# Patient Record
Sex: FEMALE | Race: WHITE | NOT HISPANIC OR LATINO | URBAN - METROPOLITAN AREA
[De-identification: names, ages, dates, MRNs, and addresses within clinical notes are randomized per-mention and may not be internally consistent; named-entity substitution may affect disease eponyms.]

---

## 2024-03-22 ENCOUNTER — EMERGENCY (EMERGENCY)
Facility: HOSPITAL | Age: 54
LOS: 0 days | Discharge: HOME | End: 2024-03-22
Attending: EMERGENCY MEDICINE
Payer: MEDICAID

## 2024-03-22 VITALS
HEART RATE: 100 BPM | TEMPERATURE: 98 F | DIASTOLIC BLOOD PRESSURE: 89 MMHG | WEIGHT: 179.9 LBS | RESPIRATION RATE: 18 BRPM | SYSTOLIC BLOOD PRESSURE: 129 MMHG | OXYGEN SATURATION: 99 %

## 2024-03-22 DIAGNOSIS — R11.0 NAUSEA: ICD-10-CM

## 2024-03-22 DIAGNOSIS — I10 ESSENTIAL (PRIMARY) HYPERTENSION: ICD-10-CM

## 2024-03-22 DIAGNOSIS — R42 DIZZINESS AND GIDDINESS: ICD-10-CM

## 2024-03-22 DIAGNOSIS — E78.5 HYPERLIPIDEMIA, UNSPECIFIED: ICD-10-CM

## 2024-03-22 DIAGNOSIS — R52 PAIN, UNSPECIFIED: ICD-10-CM

## 2024-03-22 DIAGNOSIS — R50.9 FEVER, UNSPECIFIED: ICD-10-CM

## 2024-03-22 DIAGNOSIS — R19.7 DIARRHEA, UNSPECIFIED: ICD-10-CM

## 2024-03-22 LAB
ALBUMIN SERPL ELPH-MCNC: 4.7 G/DL — SIGNIFICANT CHANGE UP (ref 3.5–5.2)
ALP SERPL-CCNC: 65 U/L — SIGNIFICANT CHANGE UP (ref 30–115)
ALT FLD-CCNC: 44 U/L — HIGH (ref 0–41)
ANION GAP SERPL CALC-SCNC: 14 MMOL/L — SIGNIFICANT CHANGE UP (ref 7–14)
AST SERPL-CCNC: 22 U/L — SIGNIFICANT CHANGE UP (ref 0–41)
BASOPHILS # BLD AUTO: 0.02 K/UL — SIGNIFICANT CHANGE UP (ref 0–0.2)
BASOPHILS NFR BLD AUTO: 0.3 % — SIGNIFICANT CHANGE UP (ref 0–1)
BILIRUB SERPL-MCNC: 0.9 MG/DL — SIGNIFICANT CHANGE UP (ref 0.2–1.2)
BUN SERPL-MCNC: 10 MG/DL — SIGNIFICANT CHANGE UP (ref 10–20)
CALCIUM SERPL-MCNC: 9.1 MG/DL — SIGNIFICANT CHANGE UP (ref 8.4–10.5)
CHLORIDE SERPL-SCNC: 101 MMOL/L — SIGNIFICANT CHANGE UP (ref 98–110)
CO2 SERPL-SCNC: 23 MMOL/L — SIGNIFICANT CHANGE UP (ref 17–32)
CREAT SERPL-MCNC: 0.6 MG/DL — LOW (ref 0.7–1.5)
EGFR: 107 ML/MIN/1.73M2 — SIGNIFICANT CHANGE UP
EOSINOPHIL # BLD AUTO: 0.02 K/UL — SIGNIFICANT CHANGE UP (ref 0–0.7)
EOSINOPHIL NFR BLD AUTO: 0.3 % — SIGNIFICANT CHANGE UP (ref 0–8)
GLUCOSE SERPL-MCNC: 116 MG/DL — HIGH (ref 70–99)
HCT VFR BLD CALC: 41 % — SIGNIFICANT CHANGE UP (ref 37–47)
HGB BLD-MCNC: 14.3 G/DL — SIGNIFICANT CHANGE UP (ref 12–16)
IMM GRANULOCYTES NFR BLD AUTO: 0.3 % — SIGNIFICANT CHANGE UP (ref 0.1–0.3)
LYMPHOCYTES # BLD AUTO: 0.52 K/UL — LOW (ref 1.2–3.4)
LYMPHOCYTES # BLD AUTO: 8.7 % — LOW (ref 20.5–51.1)
MCHC RBC-ENTMCNC: 31.3 PG — HIGH (ref 27–31)
MCHC RBC-ENTMCNC: 34.9 G/DL — SIGNIFICANT CHANGE UP (ref 32–37)
MCV RBC AUTO: 89.7 FL — SIGNIFICANT CHANGE UP (ref 81–99)
MONOCYTES # BLD AUTO: 0.35 K/UL — SIGNIFICANT CHANGE UP (ref 0.1–0.6)
MONOCYTES NFR BLD AUTO: 5.8 % — SIGNIFICANT CHANGE UP (ref 1.7–9.3)
NEUTROPHILS # BLD AUTO: 5.06 K/UL — SIGNIFICANT CHANGE UP (ref 1.4–6.5)
NEUTROPHILS NFR BLD AUTO: 84.6 % — HIGH (ref 42.2–75.2)
NRBC # BLD: 0 /100 WBCS — SIGNIFICANT CHANGE UP (ref 0–0)
PLATELET # BLD AUTO: 208 K/UL — SIGNIFICANT CHANGE UP (ref 130–400)
PMV BLD: 8.8 FL — SIGNIFICANT CHANGE UP (ref 7.4–10.4)
POTASSIUM SERPL-MCNC: 4 MMOL/L — SIGNIFICANT CHANGE UP (ref 3.5–5)
POTASSIUM SERPL-SCNC: 4 MMOL/L — SIGNIFICANT CHANGE UP (ref 3.5–5)
PROT SERPL-MCNC: 7.4 G/DL — SIGNIFICANT CHANGE UP (ref 6–8)
RBC # BLD: 4.57 M/UL — SIGNIFICANT CHANGE UP (ref 4.2–5.4)
RBC # FLD: 12.5 % — SIGNIFICANT CHANGE UP (ref 11.5–14.5)
SODIUM SERPL-SCNC: 138 MMOL/L — SIGNIFICANT CHANGE UP (ref 135–146)
WBC # BLD: 5.99 K/UL — SIGNIFICANT CHANGE UP (ref 4.8–10.8)
WBC # FLD AUTO: 5.99 K/UL — SIGNIFICANT CHANGE UP (ref 4.8–10.8)

## 2024-03-22 PROCEDURE — 96375 TX/PRO/DX INJ NEW DRUG ADDON: CPT

## 2024-03-22 PROCEDURE — 99284 EMERGENCY DEPT VISIT MOD MDM: CPT

## 2024-03-22 PROCEDURE — 85025 COMPLETE CBC W/AUTO DIFF WBC: CPT

## 2024-03-22 PROCEDURE — 80053 COMPREHEN METABOLIC PANEL: CPT

## 2024-03-22 PROCEDURE — 96374 THER/PROPH/DIAG INJ IV PUSH: CPT

## 2024-03-22 PROCEDURE — 99284 EMERGENCY DEPT VISIT MOD MDM: CPT | Mod: 25

## 2024-03-22 PROCEDURE — 36415 COLL VENOUS BLD VENIPUNCTURE: CPT

## 2024-03-22 RX ORDER — FAMOTIDINE 10 MG/ML
20 INJECTION INTRAVENOUS ONCE
Refills: 0 | Status: COMPLETED | OUTPATIENT
Start: 2024-03-22 | End: 2024-03-22

## 2024-03-22 RX ORDER — ONDANSETRON 8 MG/1
4 TABLET, FILM COATED ORAL ONCE
Refills: 0 | Status: COMPLETED | OUTPATIENT
Start: 2024-03-22 | End: 2024-03-22

## 2024-03-22 RX ORDER — METOCLOPRAMIDE HCL 10 MG
10 TABLET ORAL ONCE
Refills: 0 | Status: COMPLETED | OUTPATIENT
Start: 2024-03-22 | End: 2024-03-22

## 2024-03-22 RX ADMIN — FAMOTIDINE 20 MILLIGRAM(S): 10 INJECTION INTRAVENOUS at 09:07

## 2024-03-22 RX ADMIN — Medication 104 MILLIGRAM(S): at 10:22

## 2024-03-22 RX ADMIN — ONDANSETRON 4 MILLIGRAM(S): 8 TABLET, FILM COATED ORAL at 09:09

## 2024-03-22 NOTE — ED ADULT NURSE NOTE - OBJECTIVE STATEMENT
Pt presents to the ED c/o N/D and abdominal pain that started last night. Denies any vomiting. Denies any urinary symptoms.

## 2024-03-22 NOTE — ED PROVIDER NOTE - NSFOLLOWUPINSTRUCTIONS_ED_ALL_ED_FT
Follow up with PMD in 1-3 days    Acute Nausea and Vomiting    WHAT YOU NEED TO KNOW:  Acute nausea and vomiting start suddenly, worsen quickly, and last a short time.    DISCHARGE INSTRUCTIONS:    Return to the emergency department if:   You see blood in your vomit or your bowel movements.  You have sudden, severe pain in your chest and upper abdomen after hard vomiting or retching.  You have swelling in your neck and chest.   You are dizzy, cold, and thirsty and your eyes and mouth are dry.  You are urinating very little or not at all.  You have muscle weakness, leg cramps, and trouble breathing.   Your heart is beating much faster than normal.       You continue to vomit for more than 48 hours.     Contact your healthcare provider if:   You have frequent dry heaves (vomiting but nothing comes out).  Your nausea and vomiting does not get better or go away after you use medicine.  You have questions or concerns about your condition or treatment.    Medicines: You may need any of the following:   Medicines may be given to calm your stomach and stop your vomiting. You may also need medicines to help you feel more relaxed or to stop nausea and vomiting caused by motion sickness.  Gastrointestinal stimulants are used to help empty your stomach and bowels. This may help decrease nausea and vomiting.  Take your medicine as directed. Contact your healthcare provider if you think your medicine is not helping or if you have side effects. Tell him or her if you are allergic to any medicine. Keep a list of the medicines, vitamins, and herbs you take. Include the amounts, and when and why you take them. Bring the list or the pill bottles to follow-up visits. Carry your medicine list with you in case of an emergency.    Prevent or manage acute nausea and vomiting:   Do not drink alcohol. Alcohol may upset or irritate your stomach. Too much alcohol can also cause acute nausea and vomiting.  Control stress. Headaches due to stress may cause nausea and vomiting. Find ways to relax and manage your stress. Get more rest and sleep  Drink more liquids as directed. Vomiting can lead to dehydration. It is important to drink more liquids to help replace lost body fluids. Ask your healthcare provider how much liquid to drink each day and which liquids are best for you. Your provider may recommend that you drink an oral rehydration solution (ORS). ORS contains water, salts, and sugar that are needed to replace the lost body fluids. Ask what kind of ORS to use, how much to drink, and where to get it.  Eat smaller meals, more often. Eat small amounts of food every 2 to 3 hours, even if you are not hungry. Food in your stomach may decrease your nausea.  Talk to your healthcare provider before you take over-the-counter (OTC) medicines. These medicines can cause serious problems if you use certain other medicines, or you have a medical condition. You may have problems if you use too much or use them for longer than the label says. Follow directions on the label carefully.     Follow up with your healthcare provider as directed: Write down your questions so you remember to ask them during your follow-up visits.      Acute Diarrhea    WHAT YOU NEED TO KNOW:    Acute diarrhea starts quickly and lasts a short time, usually 1 to 3 days. It can last up to 2 weeks. You may not be able to control your diarrhea. Acute diarrhea usually stops on its own.     DISCHARGE INSTRUCTIONS:    Return to the emergency department if:     You feel confused.       Your heartbeat is faster than usual.       Your eyes look deeply sunken, or you have no tears when you cry.       You urinate less than usual, or your urine is dark yellow.       You have blood or mucus in your bowel movements.      You have severe abdominal pain.       You are unable to drink any liquids.     Contact your healthcare provider if:     Your symptoms do not get better with treatment.       You have a fever higher than 101.3°F (38.5°C).       You have trouble eating and drinking because you are vomiting.       Your diarrhea does not get better in 7 days.       You have questions or concerns about your condition or care.     Follow up with your healthcare provider as directed: Write down your questions so you remember to ask them during your visits.     Medicines:    Diarrhea medicine is an over-the-counter medicine that helps slow or stop your diarrhea. Do not take this medicine unless your healthcare provider says it is okay.       Antibiotics may be given to help treat an infection caused by bacteria.       Antiparasitics may be given to treat an infection caused by parasites.       Take your medicine as directed. Contact your healthcare provider if you think your medicine is not helping or if you have side effects. Tell him of her if you are allergic to any medicine. Keep a list of the medicines, vitamins, and herbs you take. Include the amounts, and when and why you take them. Bring the list or the pill bottles to follow-up visits. Carry your medicine list with you in case of an emergency.    Self-care:     Drink liquids as directed. Liquids will help prevent dehydration caused by diarrhea. Ask your healthcare provider how much liquid to drink each day and which liquids are best for you. You may need to drink an oral rehydration solution (ORS). An ORS has the right amounts of water, salts, and sugar you need to replace body fluids. You can buy an ORS at most grocery stores and pharmacies.       Eat foods that are easy to digest. Examples include rice, lentils, cereal, bananas, potatoes, and bread. It also includes some fruits (bananas, melon), well-cooked vegetables, and lean meats. Do not eat foods high in fiber, fat, and sugar. Do not drink alcohol until your diarrhea is gone.     Prevent acute diarrhea:     Wash your hands often. Use soap and water. Wash your hands before you eat or prepare food. Also wash your hands after you use the bathroom. Use an alcohol-based hand gel when soap and water are not available. Handwashing           Keep bathroom surfaces clean. This helps prevent the spread of germs that cause acute diarrhea.       Wash fruits and vegetables well before you eat them. This can help remove germs that cause diarrhea. If possible, remove the skin from fruits and vegetables, or cook them well before you eat them.       Cook meat and poultry as directed. Meat includes beef and pork. Poultry includes chicken, turkey, and duck.  Cook ground meat to 160°F.       Cook ground poultry, whole poultry, or cuts of poultry to at least 165°F. Remove the poultry from heat. Let it stand for 3 minutes before you eat it.       Cook whole cuts of meat other than poultry to at least 145°F. Remove the meat from heat. Let it stand for 3 minutes before you eat it.       Wash dishes that have touched raw meat or poultry with hot water and soap. This includes cutting boards, utensils, dishes, and serving containers.       Place raw or cooked meat or poultry in the refrigerator as soon as possible. Bacteria can grow in meat or poultry that is left at room temperature too long.       Do not eat raw or undercooked oysters, clams, or mussels. These foods may be contaminated and cause infection.       Drink only filtered or treated water when you travel. Do not put ice in your drinks. Drink bottled water whenever possible.          © Copyright [employer] 2019 All illustrations and images included in CareNotes are the copyrighted property of [email or web address] or [employer].

## 2024-03-22 NOTE — ED PROVIDER NOTE - PHYSICAL EXAMINATION
Constitutional: Well developed, well nourished, no acute distress  Head: Normocephalic, Atraumatic  Eyes: PERRLA, EOMI, conjunctiva and sclera WNL  ENT: Moist mucous membranes, no rhinorrhea   Neck: Supple, Nontender   Respiratory: Normal chest excursion with respiration; Breath sounds clear and equal B/L; No wheezes, rales, or rhonchi   Cardiovascular: RRR; Normal S1, S2; No murmurs, rubs or gallops   ABD/GI:  Nondistended; Nontender; No guarding, rigidity or rebound; No CVA tenderness  EXT/MS: Moving all extremities; Distal pulses 2+ B/L; No peripheral edema  Neurologic: AAO x 4;  Normal motor and sensory function  Psychiatric: Appropriate affect, normal mood

## 2024-03-22 NOTE — ED PROVIDER NOTE - PATIENT PORTAL LINK FT
You can access the FollowMyHealth Patient Portal offered by [facility] by registering at the following website: [email or web address]. By joining [facility]’s FollowMyHealth portal, you will also be able to view your health information using other applications (apps) compatible with our system.

## 2024-03-22 NOTE — ED PROVIDER NOTE - CLINICAL SUMMARY MEDICAL DECISION MAKING FREE TEXT BOX
53-year-old female, past medical history of hypertension, comes in complaining of bodyaches, nausea, and nonbloody diarrhea since last night.  Reports fever of 37C.  No cough, congestion, chest pain/shortness of breath, vomiting.  No recent travel.  No recent biotics.  No sick contacts.  On exam, pt in NAD, AAOx3, head NC/AT, CN II-XII intact, lungs CTA B/L, CV S1S2 regular, abdomen soft/NT/ND/(+)BS, ext (-) edema, motor 5/5x4, sensation intact.  Pt reassessed after treatment. Feels better. Will d/c. Strict return precautions provided.

## 2024-03-22 NOTE — ED PROVIDER NOTE - OBJECTIVE STATEMENT
53-year-old female with past medical history of hypertension and hyperlipidemia presenting with diarrhea since 11 PM last night.  Patient endorsing nausea, lightheaded and myalgias.  No fever, headache, dizziness, vomiting, chest pain, shortness of breath, abdominal pain,  symptoms.  Patient denies any recent sick contacts.  Patient states she has not anything out of the ordinary and has had no recent travel.  No recent antibiotic use.

## 2024-03-22 NOTE — ED PROVIDER NOTE - ATTENDING CONTRIBUTION TO CARE
53-year-old female, past medical history of hypertension, comes in complaining of bodyaches, nausea, and nonbloody diarrhea since last night.  Reports fever of 37C.  No cough, congestion, chest pain/shortness of breath, vomiting.  No recent travel.  No recent biotics.  No sick contacts.  On exam, pt in NAD, AAOx3, head NC/AT, CN II-XII intact, lungs CTA B/L, CV S1S2 regular, abdomen soft/NT/ND/(+)BS, ext (-) edema, motor 5/5x4, sensation intact.  Will do labs, give IV fluids/Zofran, and reevaluate.

## 2024-03-22 NOTE — ED ADULT NURSE NOTE - NSFALLUNIVINTERV_ED_ALL_ED
Bed/Stretcher in lowest position, wheels locked, appropriate side rails in place/Call bell, personal items and telephone in reach/Instruct patient to call for assistance before getting out of bed/chair/stretcher/Non-slip footwear applied when patient is off stretcher/[place] to call system/Physically safe environment - no spills, clutter or unnecessary equipment/Purposeful proactive rounding/Room/bathroom lighting operational, light cord in reach